# Patient Record
Sex: FEMALE | Race: OTHER
[De-identification: names, ages, dates, MRNs, and addresses within clinical notes are randomized per-mention and may not be internally consistent; named-entity substitution may affect disease eponyms.]

---

## 2019-08-03 ENCOUNTER — HOSPITAL ENCOUNTER (EMERGENCY)
Dept: HOSPITAL 8 - ED | Age: 29
Discharge: HOME | End: 2019-08-03
Payer: SELF-PAY

## 2019-08-03 VITALS — DIASTOLIC BLOOD PRESSURE: 89 MMHG | SYSTOLIC BLOOD PRESSURE: 135 MMHG

## 2019-08-03 VITALS — WEIGHT: 154.32 LBS | HEIGHT: 67 IN | BODY MASS INDEX: 24.22 KG/M2

## 2019-08-03 DIAGNOSIS — F15.10: ICD-10-CM

## 2019-08-03 DIAGNOSIS — N18.9: ICD-10-CM

## 2019-08-03 DIAGNOSIS — F17.200: ICD-10-CM

## 2019-08-03 DIAGNOSIS — G40.309: Primary | ICD-10-CM

## 2019-08-03 LAB
ALBUMIN SERPL-MCNC: 3.6 G/DL (ref 3.4–5)
ALP SERPL-CCNC: 97 U/L (ref 45–117)
ALT SERPL-CCNC: 30 U/L (ref 12–78)
ANION GAP SERPL CALC-SCNC: 10 MMOL/L (ref 5–15)
BASOPHILS # BLD AUTO: 0.04 X10^3/UL (ref 0–0.1)
BASOPHILS NFR BLD AUTO: 0 % (ref 0–1)
BILIRUB SERPL-MCNC: 0.4 MG/DL (ref 0.2–1)
CALCIUM SERPL-MCNC: 8.6 MG/DL (ref 8.5–10.1)
CHLORIDE SERPL-SCNC: 109 MMOL/L (ref 98–107)
CREAT SERPL-MCNC: 1.23 MG/DL (ref 0.55–1.02)
EOSINOPHIL # BLD AUTO: 0.67 X10^3/UL (ref 0–0.4)
EOSINOPHIL NFR BLD AUTO: 7 % (ref 1–7)
ERYTHROCYTE [DISTWIDTH] IN BLOOD BY AUTOMATED COUNT: 13.1 % (ref 9.6–15.2)
LYMPHOCYTES # BLD AUTO: 3.15 X10^3/UL (ref 1–3.4)
LYMPHOCYTES NFR BLD AUTO: 31 % (ref 22–44)
MCH RBC QN AUTO: 31.3 PG (ref 27–34.8)
MCHC RBC AUTO-ENTMCNC: 33.1 G/DL (ref 32.4–35.8)
MCV RBC AUTO: 94.8 FL (ref 80–100)
MD: NO
MONOCYTES # BLD AUTO: 0.67 X10^3/UL (ref 0.2–0.8)
MONOCYTES NFR BLD AUTO: 7 % (ref 2–9)
NEUTROPHILS # BLD AUTO: 5.5 X10^3/UL (ref 1.8–6.8)
NEUTROPHILS NFR BLD AUTO: 55 % (ref 42–75)
PLATELET # BLD AUTO: 328 X10^3/UL (ref 130–400)
PMV BLD AUTO: 7.7 FL (ref 7.4–10.4)
PROT SERPL-MCNC: 8 G/DL (ref 6.4–8.2)
RBC # BLD AUTO: 4.67 X10^6/UL (ref 3.82–5.3)

## 2019-08-03 PROCEDURE — 80053 COMPREHEN METABOLIC PANEL: CPT

## 2019-08-03 PROCEDURE — 93005 ELECTROCARDIOGRAM TRACING: CPT

## 2019-08-03 PROCEDURE — 36415 COLL VENOUS BLD VENIPUNCTURE: CPT

## 2019-08-03 PROCEDURE — 99284 EMERGENCY DEPT VISIT MOD MDM: CPT

## 2019-08-03 PROCEDURE — 96374 THER/PROPH/DIAG INJ IV PUSH: CPT

## 2019-08-03 PROCEDURE — 70450 CT HEAD/BRAIN W/O DYE: CPT

## 2019-08-03 PROCEDURE — 85025 COMPLETE CBC W/AUTO DIFF WBC: CPT

## 2019-08-03 NOTE — NUR
SEE TRIAGE NOTE.  PT WITH TWO BITES TO TONGUE, BLEEDING CONTROLLED.  PT DENIES 
PAIN AT THIS TIME, + "JAW STIFFNESS".  PT PLACED ON HEART MONITOR, BP CUFF, 
PULSE OX.  SEIZURE PADS TO RAILS, WARM BLANKET AND WATER PROVIDED.  CALL LIGHT 
WITHIN REACH.  EKG COMPLETED ON ARRIVAL.